# Patient Record
Sex: MALE | Race: WHITE | NOT HISPANIC OR LATINO
[De-identification: names, ages, dates, MRNs, and addresses within clinical notes are randomized per-mention and may not be internally consistent; named-entity substitution may affect disease eponyms.]

---

## 2021-11-17 ENCOUNTER — DASHBOARD ENCOUNTERS (OUTPATIENT)
Age: 46
End: 2021-11-17

## 2021-11-17 ENCOUNTER — OFFICE VISIT (OUTPATIENT)
Dept: URBAN - METROPOLITAN AREA CLINIC 78 | Facility: CLINIC | Age: 46
End: 2021-11-17
Payer: COMMERCIAL

## 2021-11-17 ENCOUNTER — TELEPHONE ENCOUNTER (OUTPATIENT)
Dept: URBAN - METROPOLITAN AREA CLINIC 78 | Facility: CLINIC | Age: 46
End: 2021-11-17

## 2021-11-17 VITALS
DIASTOLIC BLOOD PRESSURE: 83 MMHG | WEIGHT: 156.8 LBS | BODY MASS INDEX: 23.76 KG/M2 | HEART RATE: 66 BPM | RESPIRATION RATE: 16 BRPM | SYSTOLIC BLOOD PRESSURE: 127 MMHG | TEMPERATURE: 97.6 F | HEIGHT: 68 IN

## 2021-11-17 DIAGNOSIS — B18.2 CHRONIC HEPATITIS C VIRUS GENOTYPE 2 INFECTION: ICD-10-CM

## 2021-11-17 DIAGNOSIS — R10.84 GENERALIZED ABDOMINAL PAIN: ICD-10-CM

## 2021-11-17 PROBLEM — 128302006: Status: ACTIVE | Noted: 2021-11-17

## 2021-11-17 PROCEDURE — 99243 OFF/OP CNSLTJ NEW/EST LOW 30: CPT | Performed by: INTERNAL MEDICINE

## 2021-11-17 PROCEDURE — 99203 OFFICE O/P NEW LOW 30 MIN: CPT | Performed by: INTERNAL MEDICINE

## 2021-11-17 RX ORDER — GLECAPREVIR AND PIBRENTASVIR 40; 100 MG/1; MG/1
3 TABLETS TABLET, FILM COATED ORAL ONCE A DAY
Qty: 168 TABLET | Refills: 0 | OUTPATIENT
Start: 2021-11-17 | End: 2022-01-12

## 2021-11-17 NOTE — HPI-TODAY'S VISIT:
Patient was referred by Kayla Blackwood A copy of this document will be sent to the physician.  Patient has Hep C dx 10 years ago when he was incarceration  He has hx of drug abuse and tattoes  He is accompanied by mom and is sober for 3 years now on methadone  Hep C was never treated US of liver done 2 months ago at  [facility] He has gen abdominal pain and has CT scans of chest and abd wo constrast done via PCP  Last labs done 3-4 months ago  Reportedly had Hepatitis B infection too but mila  No risk for HIV ( tested neg this year ) Labs dated 9/22/2021: WBCs 11.6 Hemoglobin 15.9/hematocrit of 46.9/platelet of 219 Neutrophils of 7.5 Glucose 85 BUN 6 creatinine of 0.66 Albumin is 3.9 Alkaline phosphatase 141   Total cholesterol 148 HDL 31   HCV FibroSure: Fibrosis score is 0.32 Necroinflammatory score is 0.55 Stage F2 suggesting bridging fibrosis with few septa HIV is nonreactive HCV is positive with 12,400,000 IUs/mL/7.093 log 10 Hepatitis B surface antigen negative Hepatitis C genotype 2a/2c